# Patient Record
(demographics unavailable — no encounter records)

---

## 2025-01-15 NOTE — PHYSICAL EXAM
[Normal] : soft, non-tender, non-distended, no masses palpated, no HSM and normal bowel sounds [de-identified] : r shoulder full rom, tenderness with active external rotation, strength 5/5

## 2025-01-15 NOTE — INTERPRETER SERVICES
[Time Spent: ____ minutes] : Total time spent using  services: [unfilled] minutes. The patient's primary language is not English thus required  services. [Interpreters_IDNumber] : [id number] [Interpreters_FullName] : [place] [FreeTextEntry3] : [place]

## 2025-01-15 NOTE — REVIEW OF SYSTEMS
[Cough] : cough [Negative] : Gastrointestinal [Shortness Of Breath] : no shortness of breath [Wheezing] : no wheezing [Dyspnea on Exertion] : no dyspnea on exertion

## 2025-01-15 NOTE — HISTORY OF PRESENT ILLNESS
[FreeTextEntry1] : establish care [de-identified] : 54F PMHx GERD, HTN, Fe def anemia presents to establish care. Pt reports 3 years of right shoulder pain with rotational arm motions and lifting heavy objects. pt has not tried any therapies to make pain better. active use of arm causes pain.

## 2025-01-15 NOTE — ASSESSMENT
[FreeTextEntry1] : GERD - c/w pantoprazole - follows w GI at [facility]  HTN - c/w labetolol  Fe def anemia - iron infusions with QMA - f/u iron panel and cbc   Rotator cuff strain, chronic - pain with active external rotation - full rom - strength preserved - sensation intact - PT referral  [facility] Colonoscopy (2024) Endoscopy (2024) Mammo (2023) - scheduled for 01/2025 - follows with private GYN in [place] Pap Smear (2023) covid vaccine in 2021 declines flu shot declines shingles vaccine

## 2025-03-12 NOTE — HISTORY OF PRESENT ILLNESS
[FreeTextEntry1] : follow up [de-identified] : 54F PMHx GERD, HTN, Fe def anemia presents for follow up. pt reports her allergies are very bad. 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 she saw optho for itchy eyes and was given Ketotifen 0.035% to use bid. Pt started eye drops with improvement. persistent rhinorrhea and itchy throat

## 2025-03-12 NOTE — ASSESSMENT
[FreeTextEntry1] : Allergies - has known cat allergy and is taking care of family member who has cats in their house - mask and gloves when in house - rx loratadine 10mg  Anemia - stablished care with Heme, started Venofer, note reviewed